# Patient Record
Sex: MALE | Race: WHITE | NOT HISPANIC OR LATINO | URBAN - METROPOLITAN AREA
[De-identification: names, ages, dates, MRNs, and addresses within clinical notes are randomized per-mention and may not be internally consistent; named-entity substitution may affect disease eponyms.]

---

## 2018-06-18 ENCOUNTER — INPATIENT (INPATIENT)
Facility: HOSPITAL | Age: 53
LOS: 1 days | Discharge: HOME | End: 2018-06-20
Attending: INTERNAL MEDICINE | Admitting: INTERNAL MEDICINE
Payer: COMMERCIAL

## 2018-06-18 VITALS
DIASTOLIC BLOOD PRESSURE: 73 MMHG | SYSTOLIC BLOOD PRESSURE: 141 MMHG | RESPIRATION RATE: 16 BRPM | HEART RATE: 130 BPM | TEMPERATURE: 99 F | OXYGEN SATURATION: 100 %

## 2018-06-18 DIAGNOSIS — D72.829 ELEVATED WHITE BLOOD CELL COUNT, UNSPECIFIED: ICD-10-CM

## 2018-06-18 DIAGNOSIS — I48.91 UNSPECIFIED ATRIAL FIBRILLATION: ICD-10-CM

## 2018-06-18 DIAGNOSIS — Z29.9 ENCOUNTER FOR PROPHYLACTIC MEASURES, UNSPECIFIED: ICD-10-CM

## 2018-06-18 DIAGNOSIS — Z09 ENCOUNTER FOR FOLLOW-UP EXAMINATION AFTER COMPLETED TREATMENT FOR CONDITIONS OTHER THAN MALIGNANT NEOPLASM: Chronic | ICD-10-CM

## 2018-06-18 LAB
ALBUMIN SERPL ELPH-MCNC: 4.4 G/DL — SIGNIFICANT CHANGE UP (ref 3.3–5)
ALP SERPL-CCNC: 73 U/L — SIGNIFICANT CHANGE UP (ref 40–120)
ALT FLD-CCNC: 11 U/L — SIGNIFICANT CHANGE UP (ref 4–41)
APTT BLD: 32.1 SEC — SIGNIFICANT CHANGE UP (ref 27.5–37.4)
AST SERPL-CCNC: 19 U/L — SIGNIFICANT CHANGE UP (ref 4–40)
BASOPHILS # BLD AUTO: 0.03 K/UL — SIGNIFICANT CHANGE UP (ref 0–0.2)
BASOPHILS NFR BLD AUTO: 0.2 % — SIGNIFICANT CHANGE UP (ref 0–2)
BILIRUB SERPL-MCNC: 1.1 MG/DL — SIGNIFICANT CHANGE UP (ref 0.2–1.2)
BUN SERPL-MCNC: 16 MG/DL — SIGNIFICANT CHANGE UP (ref 7–23)
CALCIUM SERPL-MCNC: 9 MG/DL — SIGNIFICANT CHANGE UP (ref 8.4–10.5)
CHLORIDE SERPL-SCNC: 102 MMOL/L — SIGNIFICANT CHANGE UP (ref 98–107)
CO2 SERPL-SCNC: 23 MMOL/L — SIGNIFICANT CHANGE UP (ref 22–31)
CREAT SERPL-MCNC: 1.08 MG/DL — SIGNIFICANT CHANGE UP (ref 0.5–1.3)
EOSINOPHIL # BLD AUTO: 0.08 K/UL — SIGNIFICANT CHANGE UP (ref 0–0.5)
EOSINOPHIL NFR BLD AUTO: 0.7 % — SIGNIFICANT CHANGE UP (ref 0–6)
GLUCOSE SERPL-MCNC: 176 MG/DL — HIGH (ref 70–99)
HCT VFR BLD CALC: 37.8 % — LOW (ref 39–50)
HGB BLD-MCNC: 12.7 G/DL — LOW (ref 13–17)
IMM GRANULOCYTES # BLD AUTO: 0.08 # — SIGNIFICANT CHANGE UP
IMM GRANULOCYTES NFR BLD AUTO: 0.7 % — SIGNIFICANT CHANGE UP (ref 0–1.5)
INR BLD: 1.24 — HIGH (ref 0.88–1.17)
LYMPHOCYTES # BLD AUTO: 2.6 K/UL — SIGNIFICANT CHANGE UP (ref 1–3.3)
LYMPHOCYTES # BLD AUTO: 21.6 % — SIGNIFICANT CHANGE UP (ref 13–44)
MCHC RBC-ENTMCNC: 30.1 PG — SIGNIFICANT CHANGE UP (ref 27–34)
MCHC RBC-ENTMCNC: 33.6 % — SIGNIFICANT CHANGE UP (ref 32–36)
MCV RBC AUTO: 89.6 FL — SIGNIFICANT CHANGE UP (ref 80–100)
MONOCYTES # BLD AUTO: 0.72 K/UL — SIGNIFICANT CHANGE UP (ref 0–0.9)
MONOCYTES NFR BLD AUTO: 6 % — SIGNIFICANT CHANGE UP (ref 2–14)
NEUTROPHILS # BLD AUTO: 8.5 K/UL — HIGH (ref 1.8–7.4)
NEUTROPHILS NFR BLD AUTO: 70.8 % — SIGNIFICANT CHANGE UP (ref 43–77)
NRBC # FLD: 0 — SIGNIFICANT CHANGE UP
PLATELET # BLD AUTO: 212 K/UL — SIGNIFICANT CHANGE UP (ref 150–400)
PMV BLD: 11.3 FL — SIGNIFICANT CHANGE UP (ref 7–13)
POTASSIUM SERPL-MCNC: 3.9 MMOL/L — SIGNIFICANT CHANGE UP (ref 3.5–5.3)
POTASSIUM SERPL-SCNC: 3.9 MMOL/L — SIGNIFICANT CHANGE UP (ref 3.5–5.3)
PROT SERPL-MCNC: 7.2 G/DL — SIGNIFICANT CHANGE UP (ref 6–8.3)
PROTHROM AB SERPL-ACNC: 13.8 SEC — HIGH (ref 9.8–13.1)
RBC # BLD: 4.22 M/UL — SIGNIFICANT CHANGE UP (ref 4.2–5.8)
RBC # FLD: 15.9 % — HIGH (ref 10.3–14.5)
SODIUM SERPL-SCNC: 140 MMOL/L — SIGNIFICANT CHANGE UP (ref 135–145)
TROPONIN T, HIGH SENSITIVITY RESULT: 48 NG/L — SIGNIFICANT CHANGE UP (ref ?–14)
TROPONIN T, HIGH SENSITIVITY RESULT: 48 NG/L — SIGNIFICANT CHANGE UP (ref ?–14)
TSH SERPL-MCNC: 3.18 UIU/ML — SIGNIFICANT CHANGE UP (ref 0.27–4.2)
WBC # BLD: 12.01 K/UL — HIGH (ref 3.8–10.5)
WBC # FLD AUTO: 12.01 K/UL — HIGH (ref 3.8–10.5)

## 2018-06-18 PROCEDURE — 71250 CT THORAX DX C-: CPT | Mod: 26

## 2018-06-18 PROCEDURE — 71045 X-RAY EXAM CHEST 1 VIEW: CPT | Mod: 26

## 2018-06-18 RX ORDER — SODIUM CHLORIDE 9 MG/ML
1000 INJECTION INTRAMUSCULAR; INTRAVENOUS; SUBCUTANEOUS ONCE
Qty: 0 | Refills: 0 | Status: COMPLETED | OUTPATIENT
Start: 2018-06-18 | End: 2018-06-18

## 2018-06-18 RX ORDER — APIXABAN 2.5 MG/1
5 TABLET, FILM COATED ORAL EVERY 12 HOURS
Qty: 0 | Refills: 0 | Status: DISCONTINUED | OUTPATIENT
Start: 2018-06-19 | End: 2018-06-20

## 2018-06-18 RX ORDER — DILTIAZEM HCL 120 MG
5 CAPSULE, EXT RELEASE 24 HR ORAL
Qty: 125 | Refills: 0 | Status: DISCONTINUED | OUTPATIENT
Start: 2018-06-18 | End: 2018-06-18

## 2018-06-18 RX ORDER — MAGNESIUM SULFATE 500 MG/ML
2 VIAL (ML) INJECTION ONCE
Qty: 0 | Refills: 0 | Status: COMPLETED | OUTPATIENT
Start: 2018-06-18 | End: 2018-06-18

## 2018-06-18 RX ORDER — ENOXAPARIN SODIUM 100 MG/ML
100 INJECTION SUBCUTANEOUS ONCE
Qty: 0 | Refills: 0 | Status: COMPLETED | OUTPATIENT
Start: 2018-06-18 | End: 2018-06-18

## 2018-06-18 RX ORDER — DILTIAZEM HCL 120 MG
10 CAPSULE, EXT RELEASE 24 HR ORAL
Qty: 125 | Refills: 0 | Status: DISCONTINUED | OUTPATIENT
Start: 2018-06-18 | End: 2018-06-19

## 2018-06-18 RX ADMIN — ENOXAPARIN SODIUM 100 MILLIGRAM(S): 100 INJECTION SUBCUTANEOUS at 16:28

## 2018-06-18 RX ADMIN — Medication 5 MG/HR: at 23:37

## 2018-06-18 RX ADMIN — SODIUM CHLORIDE 1000 MILLILITER(S): 9 INJECTION INTRAMUSCULAR; INTRAVENOUS; SUBCUTANEOUS at 16:15

## 2018-06-18 RX ADMIN — Medication 50 GRAM(S): at 16:22

## 2018-06-18 RX ADMIN — SODIUM CHLORIDE 1000 MILLILITER(S): 9 INJECTION INTRAMUSCULAR; INTRAVENOUS; SUBCUTANEOUS at 15:46

## 2018-06-18 RX ADMIN — Medication 5 MG/HR: at 21:15

## 2018-06-18 RX ADMIN — Medication 10 MG/HR: at 23:37

## 2018-06-18 NOTE — H&P ADULT - ASSESSMENT
54 y/o male with a PMHx of PAF years ago with self conversion to NSR never on A/C presents to ED with dizziness and lightheadedness on exertion, found to be in rapid atrial fibrillation.

## 2018-06-18 NOTE — H&P ADULT - ATTENDING COMMENTS
seen and examined  above HPI, PMH, SH, FH, Meds, Allergies and ROS noted and personally confirmed as accurate by me  52 y/o male with a PMH of PAF, one episode 7 years ago and pre-hypertension presents to ED with dizziness and light-headedness. Pt reports that he smoked a cigar on Saturday night and while smoking it, he began to experience a sensation of light-headedness. Pt reports that his symptoms have been intermittent since then, with his symptoms worsening when he is ambulating or exerting himself. Pt elicits that he feels it the most with "abrupt movement." see above HPI for more details    PMH as above  FH noncontributory    SH non smoker  no IVDA  no ETOH abuse   lives with spouse   self employed    Gen: no loss of wt no loss of appetite  ENT: no dizziness no hearing loss  Ophth: no blurring of vision no loss of vision  Resp: No cough no sputum production  CVS: see above HPI   GI: no N/V/D  : no dysuria, hematuria  Endo: no polyuria no excessive sweating  Neuro: no weakness no paresthesias  Psych: No suicidal no depressive ideation  Heme: No petechiae no easy bruising  Msk: No joint pain no swelling  Skin: No rash no itching  All other ROS negative    Plan: now in NSR  d/w cardiology attending   echocardiogram noted  likely acute on chronic diastolic heart failure precipitated by atrial fib with rapid ventricular rate  will diurese furosemide 20 IVP daily   likely discharge home tomorrow on Apixaban 5 BID   stress as outpatient    Leukocytosis  likely reactive   no antibiotics  Ct negative pneumonia   pulmonary help appreciated     discussed with patient in detail, all questions answered   d/w cardiology attending

## 2018-06-18 NOTE — H&P ADULT - NEUROLOGICAL DETAILS
responds to verbal commands/sensation intact/cranial nerves intact/normal strength/alert and oriented x 3/responds to pain

## 2018-06-18 NOTE — H&P ADULT - RS GEN PE MLT RESP DETAILS PC
no chest wall tenderness/no rhonchi/respirations non-labored/no intercostal retractions/no rales/no wheezes/good air movement/breath sounds equal/airway patent/clear to auscultation bilaterally

## 2018-06-18 NOTE — H&P ADULT - NSHPLABSRESULTS_GEN_ALL_CORE
EKG: Atrial fibrillation at 153 bpm with RVR  CE x2: Trop 48-->48  WBC: 12.01  H/H: 12.7/37.8  Glucose: 176

## 2018-06-18 NOTE — ED PROVIDER NOTE - ATTENDING CONTRIBUTION TO CARE
53M no pmh presents for palpitations, intermittent lightheadedness for past 3 days. Patient denies headache, vision changes, focal weakness/numbness, neck pain, fever, cough, chest pain, shortness of breath, back pain, abd pain, nausea, vomiting, diarrhea, constipation, blood in stool, dysuria, rash, trauma, falls. Patient is well appearing, conversant, cooperative, smiling, head atraumatic, neck supple with full range of motion, oropharynx clear, lungs clear, no crackles or rales, speaking full sentences, heart tachycardia irregularly irregular, distal pulses equal in all 4 extremities, abdomen soft nontender nondistended with no masses, no leg edema, no calf tenderness, nonfocal neurologic exam.    Afib RVR. Rate control. IVF. TSH. CXR. EKG. Troponin. Anticoagulate. Cards consult. Consider admission.

## 2018-06-18 NOTE — H&P ADULT - NEGATIVE NEUROLOGICAL SYMPTOMS
no tremors/no paresthesias/no generalized seizures/no focal seizures/no difficulty walking/no syncope/no loss of sensation/no weakness/no headache/no confusion/no loss of consciousness/no hemiparesis/no transient paralysis

## 2018-06-18 NOTE — H&P ADULT - NEGATIVE GASTROINTESTINAL SYMPTOMS
no melena/no constipation/no abdominal pain/no nausea/no hematochezia/no change in bowel habits/no flatulence/no diarrhea/no vomiting

## 2018-06-18 NOTE — H&P ADULT - PROBLEM SELECTOR PLAN 1
Admit to telemetry, serial cardiac enzymes, serial EKGs  Will check TFTs  Start Cardizem gtt at 5mg/hr and titrate drip based on HR  Start Cardizem 30mg PO q6hrs   Start Eliquis 5mg BID (received one dose of Lovenox in ED; Eliquis to start in AM)  Echocardiogram ordered  Cards consult with Dr. Hebert called  Will consider EP consult for surgical options if pt does not self convert  F/U MD note

## 2018-06-18 NOTE — H&P ADULT - NSHPSOCIALHISTORY_GEN_ALL_CORE
Pt is  and lives with his wife and two children. He denies smoking and drinking; he has a cigar every now and then. He is a self employed . He is independent at baseline.

## 2018-06-18 NOTE — ED PROVIDER NOTE - MEDICAL DECISION MAKING DETAILS
53M pmhx htn, hld c/o palpitations since saturday. Pt arrives here w/ rate in 140s-160s w/  systolic (in setting of known htn). Pt is in Afib w/ RVR by EKG w/ assoc hemodynamic changes. Will check labs, tsh, trop, cxr. WIll administer fluids, cxr, cardizem for rate control w/ admission.

## 2018-06-18 NOTE — ED ADULT NURSE NOTE - OBJECTIVE STATEMENT
Break coverage RN:  received pt in room 18, c/o palpitations since Saturday after having a drink and a cigar.  Pt reports has had an episode of rapid heart rate in the past, "was given a medication, and it went away."  Pt AAOx3, respirations even and unlabored.  Rapid A-fib noted on cardiac monitor; Dr. Aldrich at bedside.  Pt denies chest pain.  Pt reports episode of dizziness yesterday.  No peripheral edema noted; pt denies N/V/D.  Pt appears comfortable.  Labs drawn and sent; IV access obtained.  Awaiting further MD orders.

## 2018-06-18 NOTE — H&P ADULT - NEGATIVE OPHTHALMOLOGIC SYMPTOMS
no pain R/no loss of vision L/no blurred vision R/no loss of vision R/no photophobia/no diplopia/no blurred vision L/no pain L

## 2018-06-18 NOTE — H&P ADULT - NEGATIVE CARDIOVASCULAR SYMPTOMS
no peripheral edema/no chest pain/no dyspnea on exertion/no paroxysmal nocturnal dyspnea/no palpitations/no orthopnea/no claudication

## 2018-06-18 NOTE — ED ADULT TRIAGE NOTE - CHIEF COMPLAINT QUOTE
Pt c/o of palpitations and lightheadedness was seen at his PMD and sent in for further work up pt denies chest pain.

## 2018-06-18 NOTE — H&P ADULT - PROBLEM SELECTOR PLAN 2
Pt with leukocytosis of 12.01 with a mild cough and opacity on CXR  CT chest ordered to rule out infectious process driving atrial fibrillation  Will treat with antibiotics if CT chest shows PNA

## 2018-06-18 NOTE — ED PROVIDER NOTE - OBJECTIVE STATEMENT
53M hx htn, hld, palpitations (1 episode ~7 years ago), here from PMD's office. Per pt, he's been having palpitations since Saturday night, started while he was having a drink and a cigar. Sx not assoc w/ any cp or sob. Pt came accompanied by note from PMD saying pt's ekg showed Afib w/ RVR assoc w/ hypotension. No recent illness or f/c.

## 2018-06-18 NOTE — H&P ADULT - HISTORY OF PRESENT ILLNESS
52 y/o male with a PMHx of PAF (one episode 7 years ago with medication-induced self conversion) and pre-hypertension presents to ED with dizziness and light-headedness. Pt reports that he smoked a cigar on Saturday night and while smoking it, he began to experience a sensation of light-headedness. Pt reports that his symptoms have been intermittent since then, with his symptoms worsening when he is ambulating or exerting himself. Pt elicits that he feels it the most with "abrupt movement." Although it was documented that patient was having palpitations, he does not feel any palpitations. Pt had an episode of atrial fibrillation 7 years ago and was given medications by his then cardiologist and he converted to NSR with no other intervention. Pt was never started on any systemic anticoagulation. Pt continued to have symptoms today and went to his PCP Dr. Hebert. Pt underwent EKG with his cardiologist and was found to be in rapid atrial fibrillation and was sent to the ED for further evaluation. The only other symptom that patient endorses is a cough that started a couple of hours ago, which is dry but he feels like he may have some mucus in his chest. Pt denies fever, chills, recent travel, sick contacts, headache, visual deficits, chest pain, shortness of breath, orthopnea, palpitations, abdominal pain, N/V/D/C, hematochezia, melena, dysuria, hematuria, LOC, syncope, peripheral edema. Upon arrival to ED, EKG: Atrial fibrillation at 153 bpm with RVR. CE x2: Trop 48-->48. WBC: 12.01. H/H: 12.7/37.8. Glucose: 176. Pt was given Cardizem and Lovenox and admitted to telemetry.

## 2018-06-19 LAB
BUN SERPL-MCNC: 9 MG/DL — SIGNIFICANT CHANGE UP (ref 7–23)
CALCIUM SERPL-MCNC: 8.5 MG/DL — SIGNIFICANT CHANGE UP (ref 8.4–10.5)
CHLORIDE SERPL-SCNC: 104 MMOL/L — SIGNIFICANT CHANGE UP (ref 98–107)
CHOLEST SERPL-MCNC: 172 MG/DL — SIGNIFICANT CHANGE UP (ref 120–199)
CO2 SERPL-SCNC: 20 MMOL/L — LOW (ref 22–31)
CREAT SERPL-MCNC: 0.86 MG/DL — SIGNIFICANT CHANGE UP (ref 0.5–1.3)
GLUCOSE SERPL-MCNC: 123 MG/DL — HIGH (ref 70–99)
HBA1C BLD-MCNC: 4.7 % — SIGNIFICANT CHANGE UP (ref 4–5.6)
HCT VFR BLD CALC: 37.1 % — LOW (ref 39–50)
HDLC SERPL-MCNC: 31 MG/DL — LOW (ref 35–55)
HGB BLD-MCNC: 12.6 G/DL — LOW (ref 13–17)
LIPID PNL WITH DIRECT LDL SERPL: 130 MG/DL — SIGNIFICANT CHANGE UP
MAGNESIUM SERPL-MCNC: 2.1 MG/DL — SIGNIFICANT CHANGE UP (ref 1.6–2.6)
MCHC RBC-ENTMCNC: 30.1 PG — SIGNIFICANT CHANGE UP (ref 27–34)
MCHC RBC-ENTMCNC: 34 % — SIGNIFICANT CHANGE UP (ref 32–36)
MCV RBC AUTO: 88.5 FL — SIGNIFICANT CHANGE UP (ref 80–100)
NRBC # FLD: 0 — SIGNIFICANT CHANGE UP
NT-PROBNP SERPL-SCNC: 1720 PG/ML — SIGNIFICANT CHANGE UP
PLATELET # BLD AUTO: 181 K/UL — SIGNIFICANT CHANGE UP (ref 150–400)
PMV BLD: 11.6 FL — SIGNIFICANT CHANGE UP (ref 7–13)
POTASSIUM SERPL-MCNC: 4 MMOL/L — SIGNIFICANT CHANGE UP (ref 3.5–5.3)
POTASSIUM SERPL-SCNC: 4 MMOL/L — SIGNIFICANT CHANGE UP (ref 3.5–5.3)
RBC # BLD: 4.19 M/UL — LOW (ref 4.2–5.8)
RBC # FLD: 15.9 % — HIGH (ref 10.3–14.5)
SODIUM SERPL-SCNC: 138 MMOL/L — SIGNIFICANT CHANGE UP (ref 135–145)
T3FREE SERPL-MCNC: 2.74 PG/ML — SIGNIFICANT CHANGE UP (ref 1.8–4.6)
T4 FREE SERPL-MCNC: 1.28 NG/DL — SIGNIFICANT CHANGE UP (ref 0.9–1.8)
TRIGL SERPL-MCNC: 148 MG/DL — SIGNIFICANT CHANGE UP (ref 10–149)
WBC # BLD: 12.53 K/UL — HIGH (ref 3.8–10.5)
WBC # FLD AUTO: 12.53 K/UL — HIGH (ref 3.8–10.5)

## 2018-06-19 PROCEDURE — 93306 TTE W/DOPPLER COMPLETE: CPT | Mod: 26

## 2018-06-19 RX ORDER — APIXABAN 2.5 MG/1
1 TABLET, FILM COATED ORAL
Qty: 60 | Refills: 0 | OUTPATIENT
Start: 2018-06-19 | End: 2018-07-18

## 2018-06-19 RX ORDER — FUROSEMIDE 40 MG
20 TABLET ORAL ONCE
Qty: 0 | Refills: 0 | Status: COMPLETED | OUTPATIENT
Start: 2018-06-19 | End: 2018-06-19

## 2018-06-19 RX ORDER — METOPROLOL TARTRATE 50 MG
50 TABLET ORAL DAILY
Qty: 0 | Refills: 0 | Status: DISCONTINUED | OUTPATIENT
Start: 2018-06-19 | End: 2018-06-20

## 2018-06-19 RX ORDER — DIGOXIN 250 MCG
0.5 TABLET ORAL ONCE
Qty: 0 | Refills: 0 | Status: COMPLETED | OUTPATIENT
Start: 2018-06-19 | End: 2018-06-19

## 2018-06-19 RX ORDER — DILTIAZEM HCL 120 MG
15 CAPSULE, EXT RELEASE 24 HR ORAL
Qty: 125 | Refills: 0 | Status: DISCONTINUED | OUTPATIENT
Start: 2018-06-19 | End: 2018-06-19

## 2018-06-19 RX ORDER — FUROSEMIDE 40 MG
20 TABLET ORAL DAILY
Qty: 0 | Refills: 0 | Status: COMPLETED | OUTPATIENT
Start: 2018-06-19 | End: 2018-06-20

## 2018-06-19 RX ADMIN — Medication 0.5 MILLIGRAM(S): at 02:24

## 2018-06-19 RX ADMIN — Medication 20 MILLIGRAM(S): at 00:22

## 2018-06-19 RX ADMIN — Medication 20 MILLIGRAM(S): at 14:02

## 2018-06-19 RX ADMIN — APIXABAN 5 MILLIGRAM(S): 2.5 TABLET, FILM COATED ORAL at 05:48

## 2018-06-19 RX ADMIN — APIXABAN 5 MILLIGRAM(S): 2.5 TABLET, FILM COATED ORAL at 17:13

## 2018-06-19 RX ADMIN — Medication 50 MILLIGRAM(S): at 09:03

## 2018-06-19 RX ADMIN — Medication 10 MG/HR: at 07:25

## 2018-06-19 NOTE — CONSULT NOTE ADULT - SUBJECTIVE AND OBJECTIVE BOX
Patient is a 53y old  Male who presents with a chief complaint of Dizziness, light-headedness (18 Jun 2018 21:28)      HPI:  52 y/o male with a PMHx of PAF (one episode 7 years ago with medication-induced self conversion) and pre-hypertension presents to ED with dizziness and light-headedness. Pt reports that he smoked a cigar on Saturday night and while smoking it, he began to experience a sensation of light-headedness. Pt reports that his symptoms have been intermittent since then, with his symptoms worsening when he is ambulating or exerting himself. Pt elicits that he feels it the most with "abrupt movement." Although it was documented that patient was having palpitations, he does not feel any palpitations. Pt had an episode of atrial fibrillation 7 years ago and was given medications by his then cardiologist and he converted to NSR with no other intervention. Pt was never started on any systemic anticoagulation. Pt continued to have symptoms today and went to his PCP Dr. Hebert. Pt underwent EKG with his cardiologist and was found to be in rapid atrial fibrillation and was sent to the ED for further evaluation. The only other symptom that patient endorses is a cough that started a couple of hours ago, which is dry but he feels like he may have some mucus in his chest. Pt denies fever, chills, recent travel, sick contacts, headache, visual deficits, chest pain, shortness of breath, orthopnea, palpitations, abdominal pain, N/V/D/C, hematochezia, melena, dysuria, hematuria, LOC, syncope, peripheral edema. Upon arrival to ED, EKG: Atrial fibrillation at 153 bpm with RVR. CE x2: Trop 48-->48. WBC: 12.01. H/H: 12.7/37.8. Glucose: 176. Pt was given Cardizem and Lovenox and admitted to telemetry. (18 Jun 2018 21:28)    to me pt days his cough has resolved: H ehas no underlying pulmonary disorders: Never had COPD or asthma as well as PE or DVT   ?FOLLOWING PRESENT  [x ] Hx of PE/DVT, [x ] Hx COPD, [x ] Hx of Asthma, [x ] Hx of Hospitalization, [x ]  Hx of BiPAP/CPAP use, [x] Hx of NOAH    Allergies    No Known Allergies    Intolerances        PAST MEDICAL & SURGICAL HISTORY:  Pre-hypertension  PAF (paroxysmal atrial fibrillation)  S/P umbilical hernia repair, follow-up exam      FAMILY HISTORY:  No pertinent family history in first degree relatives      Social History: x ] TOBACCO                  [ x ] ETOH                                 [ x ] IVDA/DRUGS    REVIEW OF SYSTEMS      General:	    Skin/Breast:  	  Ophthalmologic:  	  ENMT:	    Respiratory and Thorax: cough  	  Cardiovascular:	    Gastrointestinal:	    Genitourinary:	    Musculoskeletal:	    Neurological:	light headed, palpitations    Psychiatric:	    Hematology/Lymphatics:	    Endocrine:	    Allergic/Immunologic:	    MEDICATIONS  (STANDING):  apixaban 5 milliGRAM(s) Oral every 12 hours  furosemide   Injectable 20 milliGRAM(s) IV Push daily  metoprolol succinate ER 50 milliGRAM(s) Oral daily    MEDICATIONS  (PRN):       Vital Signs Last 24 Hrs  T(C): 37.3 (19 Jun 2018 09:31), Max: 37.3 (18 Jun 2018 20:35)  T(F): 99.2 (19 Jun 2018 09:31), Max: 99.2 (19 Jun 2018 09:31)  HR: 81 (19 Jun 2018 09:31) (81 - 166)  BP: 115/75 (19 Jun 2018 09:31) (93/74 - 141/73)  BP(mean): --  RR: 20 (19 Jun 2018 09:31) (14 - 21)  SpO2: 99% (19 Jun 2018 09:31) (92% - 100%)        I&O's Summary      Physical Exam:   GENERAL: NAD, well-groomed, well-developed  HEENT: ISAÍAS/   Atraumatic, Normocephalic  ENMT: No tonsillar erythema, exudates, or enlargement; Moist mucous membranes, Good dentition, No lesions  NECK: Supple, No JVD, Normal thyroid  CHEST/LUNG: Clear to auscultation bilaterally; No rales, rhonchi, wheezing, or rubs  CVS: Regular rate and rhythm; No murmurs, rubs, or gallops  GI: : Soft, Nontender, Nondistended; Bowel sounds present  NERVOUS SYSTEM:  Alert & Oriented X3  EXTREMITIES:  2+ Peripheral Pulses, No clubbing, cyanosis, or edema  LYMPH: No lymphadenopathy noted  SKIN: No rashes or lesions  ENDOCRINOLOGY: No Thyromegaly  PSYCH: Appropriate    Labs:                              12.6   12.53 )-----------( 181      ( 19 Jun 2018 05:40 )             37.1                         12.7   12.01 )-----------( 212      ( 18 Jun 2018 15:42 )             37.8     06-19    138  |  104  |  9   ----------------------------<  123<H>  4.0   |  20<L>  |  0.86  06-18    140  |  102  |  16  ----------------------------<  176<H>  3.9   |  23  |  1.08    Ca    8.5      19 Jun 2018 05:40  Ca    9.0      18 Jun 2018 15:25  Mg     2.1     06-19    TPro  7.2  /  Alb  4.4  /  TBili  1.1  /  DBili  x   /  AST  19  /  ALT  11  /  AlkPhos  73  06-18    CAPILLARY BLOOD GLUCOSE        LIVER FUNCTIONS - ( 18 Jun 2018 15:25 )  Alb: 4.4 g/dL / Pro: 7.2 g/dL / ALK PHOS: 73 u/L / ALT: 11 u/L / AST: 19 u/L / GGT: x           PT/INR - ( 18 Jun 2018 15:25 )   PT: 13.8 SEC;   INR: 1.24          PTT - ( 18 Jun 2018 15:25 )  PTT:32.1 SEC    D DImer  Serum Pro-Brain Natriuretic Peptide: 1720 pg/mL (06-19 @ 05:40)      Studies  Chest X-RAY  CT SCAN Chest   CT Abdomen  Venous Dopplers: LE:   Others    < from: CT Chest No Cont (06.18.18 @ 22:30) >  LUNGS AND LARGE AIRWAYS: Patent central airways.  3 mm right upper lobe   noncalcified pulmonary nodule (series 2, image 33). 2 mm right upper lung   noncalcified pulmonary nodule (series 2, image 40). Interlobular septal   thickening. Bibasilar subsegmental atelectasis.  PLEURA: Trace bilateral pleural effusions, within the fissures   bilaterally.  VESSELS: Within normal limits.  HEART: Heart size is normal. No pericardial effusion. Coronary artery   calcifications.  MEDIASTINUM AND JASMYNE: No lymphadenopathy.  CHEST WALL AND LOWER NECK: Within normal limits.  VISUALIZED UPPER ABDOMEN: Within normal limits.  BONES: Within normal limits.    IMPRESSION:     Interlobular septal thickening with small amounts of fluid in the   fissures bilaterally as well as peribronchial thickening consistent with   interstitial edema.    No focal consolidations.              SUDARSHAN ORTEGA M.D., RADIOLOGY RESIDENT  This document has been electronically signed.  MARIUSZ ARRIOLA M.D., ATTENDING RADIOLOGIST  This document has been electronically signed. Jun 19 2018 10:16AM        < end of copied text >

## 2018-06-19 NOTE — PATIENT PROFILE ADULT. - NS TRANSFER PATIENT BELONGINGS
Jewelry/Money (specify)/Clothing/Cell Phone/PDA (specify)/wallet with money, electronic toothbrush, blue tooth set/Electronic Device (specify)

## 2018-06-19 NOTE — CONSULT NOTE ADULT - PROBLEM SELECTOR RECOMMENDATION 2
had some cough which seems ot be ok: CT scan chest reviewed which is c/w chf: He has small bilateral pl effusion with interlobar septal thickening: He has no underlying pneumonia: No consolidation: I dont think he deserves any antibiotics at this time: Cont lasix

## 2018-06-19 NOTE — CONSULT NOTE ADULT - SUBJECTIVE AND OBJECTIVE BOX
HISTORY OF PRESENT ILLNESS:    52 yo Man with history of HTN, remote PAF, compl of 2-3 days of palpitations that began 3 nights ago after smoking a cigar and having alcoholic drink.  + orthostatic dizziness.  No chest pain or SOB.  Came to office yest and found to be in AF with RVR and relatively low BP.  Referred to ER for eval where treated with IV and PO diltiazem, IVF.  Started on Eliquis and given one dose of IV lasix.  Pt converted to SR overnight.  Currently no complaints and feels well.      PAST MEDICAL & SURGICAL HISTORY:  HTN  PAF (paroxysmal atrial fibrillation)  S/P umbilical hernia repair, follow-up exam          MEDICATIONS:  MEDICATIONS  (STANDING):  apixaban 5 milliGRAM(s) Oral every 12 hours  metoprolol succinate ER 50 milliGRAM(s) Oral daily    MEDICATIONS  (PRN):      FAM HISTORY  FAMILY HISTORY:  No pertinent family history in first degree relatives      SOCIAL HISTORY:    Social History:    Tobacco Usage:  (   ) never smoked   (   ) former smoker   (   ) current smoker  (     ) pack years  (        ) last cigarette date + occas cigar smoking  Alcohol Usage:  + social use  (     ) Advanced Directives: (     ) None    (      ) DNR    (     ) DNI    (     ) Health Care Proxy:     ALLERGIES:  Allergies    No Known Allergies    PHYSICAL EXAM:  T(C): 36.7 (06-19-18 @ 05:45), Max: 37.3 (06-18-18 @ 20:35)  HR: 82 (06-19-18 @ 05:45) (82 - 166)  BP: 125/75 (06-19-18 @ 05:45) (93/74 - 141/73)  RR: 14 (06-19-18 @ 05:45) (14 - 21)  SpO2: 95% (06-19-18 @ 05:45) (92% - 100%)  Wt(kg): --  I&O's Summary      Appearance:  Comf, NAD  HEENT:   Normal oral mucosa, PERRL, EOMI	  Lymphatic: No lymphadenopathy  Cardiovascular: Normal S1 S2, Murmur:   Neck: JVP normal  Respiratory: Lungs clear to auscultation	  Gastrointestinal:  Soft, Non-tender, + BS	  Skin: No rashes, No ecchymoses, No cyanosis	  Neurologic: Non-focal, awake , alert , oriented   Extremities: Normal range of motion, No clubbing, cyanosis or edema  Vascular: Peripheral pulses palpable 2+ bilaterally    TELEMETRY: 	  SR  ECG:  	AF with RVR, non-spec ST seg abn  RADIOLOGY:    < from: CT Chest No Cont (06.18.18 @ 22:30) >  EXAM:  CT CHEST        PROCEDURE DATE:  Jun 18 2018     ******PRELIMINARY REPORT******    ******PRELIMINARY REPORT******            INTERPRETATION:  nonspecific interlobular septal thickening and   grounglass opacities ? pulm edema and/or infection. clinical correlation   advised.            ******PRELIMINARY REPORT******    ******PRELIMINARY REPORT******              IVONNE BLISS M.D., ATTENDING RADIOLOGIST          < end of copied text >  	  	  LABS:	 	    CARDIAC MARKERS:                                  12.6   12.53 )-----------( 181      ( 19 Jun 2018 05:40 )             37.1     06-19    138  |  104  |  9   ----------------------------<  123<H>  4.0   |  20<L>  |  0.86    Ca    8.5      19 Jun 2018 05:40  Mg     2.1     06-19    TPro  7.2  /  Alb  4.4  /  TBili  1.1  /  DBili  x   /  AST  19  /  ALT  11  /  AlkPhos  73  06-18    Alanine Aminotransferase (ALT/SGPT): 11 u/L (06-18 @ 15:25)  HgA1c: Hemoglobin A1C, Whole Blood: 4.7 % (06-19 @ 05:40)    TSH: Thyroid Stimulating Hormone, Serum: 3.18 uIU/mL (06-18 @ 15:25)

## 2018-06-20 ENCOUNTER — TRANSCRIPTION ENCOUNTER (OUTPATIENT)
Age: 53
End: 2018-06-20

## 2018-06-20 VITALS — WEIGHT: 229.94 LBS

## 2018-06-20 LAB
BUN SERPL-MCNC: 13 MG/DL — SIGNIFICANT CHANGE UP (ref 7–23)
CALCIUM SERPL-MCNC: 8.8 MG/DL — SIGNIFICANT CHANGE UP (ref 8.4–10.5)
CHLORIDE SERPL-SCNC: 101 MMOL/L — SIGNIFICANT CHANGE UP (ref 98–107)
CO2 SERPL-SCNC: 22 MMOL/L — SIGNIFICANT CHANGE UP (ref 22–31)
CREAT SERPL-MCNC: 0.94 MG/DL — SIGNIFICANT CHANGE UP (ref 0.5–1.3)
GLUCOSE SERPL-MCNC: 117 MG/DL — HIGH (ref 70–99)
HCT VFR BLD CALC: 37.2 % — LOW (ref 39–50)
HGB BLD-MCNC: 13.2 G/DL — SIGNIFICANT CHANGE UP (ref 13–17)
MAGNESIUM SERPL-MCNC: 2.2 MG/DL — SIGNIFICANT CHANGE UP (ref 1.6–2.6)
MCHC RBC-ENTMCNC: 30 PG — SIGNIFICANT CHANGE UP (ref 27–34)
MCHC RBC-ENTMCNC: 35.5 % — SIGNIFICANT CHANGE UP (ref 32–36)
MCV RBC AUTO: 84.5 FL — SIGNIFICANT CHANGE UP (ref 80–100)
NRBC # FLD: 0 — SIGNIFICANT CHANGE UP
PLATELET # BLD AUTO: 183 K/UL — SIGNIFICANT CHANGE UP (ref 150–400)
PMV BLD: 11.6 FL — SIGNIFICANT CHANGE UP (ref 7–13)
POTASSIUM SERPL-MCNC: 3.8 MMOL/L — SIGNIFICANT CHANGE UP (ref 3.5–5.3)
POTASSIUM SERPL-SCNC: 3.8 MMOL/L — SIGNIFICANT CHANGE UP (ref 3.5–5.3)
RBC # BLD: 4.4 M/UL — SIGNIFICANT CHANGE UP (ref 4.2–5.8)
RBC # FLD: 15.8 % — HIGH (ref 10.3–14.5)
SODIUM SERPL-SCNC: 139 MMOL/L — SIGNIFICANT CHANGE UP (ref 135–145)
WBC # BLD: 6.81 K/UL — SIGNIFICANT CHANGE UP (ref 3.8–10.5)
WBC # FLD AUTO: 6.81 K/UL — SIGNIFICANT CHANGE UP (ref 3.8–10.5)

## 2018-06-20 RX ORDER — METOPROLOL TARTRATE 50 MG
1 TABLET ORAL
Qty: 30 | Refills: 0 | OUTPATIENT
Start: 2018-06-20 | End: 2018-07-19

## 2018-06-20 RX ADMIN — APIXABAN 5 MILLIGRAM(S): 2.5 TABLET, FILM COATED ORAL at 05:38

## 2018-06-20 RX ADMIN — Medication 50 MILLIGRAM(S): at 05:38

## 2018-06-20 RX ADMIN — Medication 20 MILLIGRAM(S): at 05:38

## 2018-06-20 NOTE — PROGRESS NOTE ADULT - PROBLEM SELECTOR PLAN 1
now in NSR  will continue to monitor as outpatient with Dr Hebert  continue metoprolol ER 50 and apixaban 5 po BID  outpatient follow up with cardiology attending for nuclear stress
cobntrolled : stable

## 2018-06-20 NOTE — DISCHARGE NOTE ADULT - PATIENT PORTAL LINK FT
You can access the [facility] Patient Portal, offered by [facility], by registering with the following website: [email or web address]

## 2018-06-20 NOTE — PROGRESS NOTE ADULT - SUBJECTIVE AND OBJECTIVE BOX
Patient is a 53y old  Male who presents with a chief complaint of Dizziness, light-headedness      SUBJECTIVE / OVERNIGHT EVENTS: no overnight events  feels great     ROS:  Resp: No cough no sputum production  CVS: No chest pain no palpitations no orthopnea  GI: no N/V/D  : no dysuria, no hematuria  Neuro: no weakness no paresthesias  Heme: No petechiae no easy bruising  Msk: No joint pain no swelling  Skin: No rash no itching        MEDICATIONS  (STANDING):  apixaban 5 milliGRAM(s) Oral every 12 hours  metoprolol succinate ER 50 milliGRAM(s) Oral daily    MEDICATIONS  (PRN):        CAPILLARY BLOOD GLUCOSE        I&O's Summary    19 Jun 2018 07:01  -  20 Jun 2018 07:00  --------------------------------------------------------  IN: 0 mL / OUT: 1300 mL / NET: -1300 mL        Vital Signs Last 24 Hrs  T(C): 36.8 (20 Jun 2018 05:37), Max: 36.9 (19 Jun 2018 20:32)  T(F): 98.2 (20 Jun 2018 05:37), Max: 98.5 (19 Jun 2018 20:32)  HR: 75 (20 Jun 2018 05:37) (74 - 77)  BP: 107/71 (20 Jun 2018 05:37) (107/71 - 136/86)  BP(mean): --  RR: 18 (20 Jun 2018 05:37) (17 - 18)  SpO2: 100% (20 Jun 2018 05:37) (96% - 100%)    PHYSICAL EXAM:  GENERAL: NAD, well-developed  HEAD:  Atraumatic, Normocephalic  EYES: EOMI, PERRLA, conjunctiva and sclera clear  NECK: Supple, No JVD  CHEST/LUNG: no rhonchi, no wheeze, clear to auscultation bilaterally  HEART: S1 S2; No rubs or gallops, no murmurs appreciated  ABDOMEN: Soft, Nontender, Nondistended; Bowel sounds present  EXTREMITIES:  No clubbing or cyanosis, + Peripheral Pulses,  no edema  PSYCH: AO x 3 appropriate affect  NEUROLOGY: non-focal, motor and sensory systems intact  SKIN: No rashes or lesions    LABS:                        13.2   6.81  )-----------( 183      ( 20 Jun 2018 05:42 )             37.2     06-20    139  |  101  |  13  ----------------------------<  117<H>  3.8   |  22  |  0.94    Ca    8.8      20 Jun 2018 05:43  Mg     2.2     06-20    TPro  7.2  /  Alb  4.4  /  TBili  1.1  /  DBili  x   /  AST  19  /  ALT  11  /  AlkPhos  73  06-18    PT/INR - ( 18 Jun 2018 15:25 )   PT: 13.8 SEC;   INR: 1.24          PTT - ( 18 Jun 2018 15:25 )  PTT:32.1 SEC            All consultant(s) notes reviewed and care discussed with other providers    Contact Number, Dr Brower [phone]
Events noted.  Case discussed with Dr Brower yesterday.  Pt given additional diuretic yesterday with good diuretic response.  Feels well, no complaints.  No recurrence of AF on tele.        MEDICATIONS:  MEDICATIONS  (STANDING):  apixaban 5 milliGRAM(s) Oral every 12 hours  metoprolol succinate ER 50 milliGRAM(s) Oral daily    MEDICATIONS  (PRN):      PHYSICAL EXAM:  T(C): 36.8 (06-20-18 @ 05:37), Max: 36.9 (06-19-18 @ 20:32)  HR: 75 (06-20-18 @ 05:37) (74 - 77)  BP: 107/71 (06-20-18 @ 05:37) (107/71 - 136/86)  RR: 18 (06-20-18 @ 05:37) (17 - 18)  SpO2: 100% (06-20-18 @ 05:37) (96% - 100%)  Wt(kg): --  I&O's Summary    19 Jun 2018 07:01  -  20 Jun 2018 07:00  --------------------------------------------------------  IN: 0 mL / OUT: 1300 mL / NET: -1300 mL      LABS:                              13.2   6.81  )-----------( 183      ( 20 Jun 2018 05:42 )             37.2     06-20    139  |  101  |  13  ----------------------------<  117<H>  3.8   |  22  |  0.94    Ca    8.8      20 Jun 2018 05:43  Mg     2.2     06-20    TPro  7.2  /  Alb  4.4  /  TBili  1.1  /  DBili  x   /  AST  19  /  ALT  11  /  AlkPhos  73  06-18    Magnesium, Serum: 2.2 mg/dL (06-20 @ 05:43)    proBNP:  > 1000  Lipid Profile:   TSH:   WNL          TELEMETRY: 	  SR  ECG:  	  RADIOLOGY:   DIAGNOSTIC TESTING:  Echocardiogram:  < from: Transthoracic Echocardiogram (06.19.18 @ 10:10) >  CONCLUSIONS:  1. Normal mitral valve. Minimal mitral regurgitation.  2. Normal trileaflet aortic valve. Minimal aortic  regurgitation.  3. Increased relative wall thickness with normal left  ventricular mass index, consistent with concentric left  ventricular remodeling.  4. Normal left ventricular systolic function. No segmental  wall motion abnormalities.  5. Normal right ventricular size and function.  ------------------------------------------------------------------------  Confirmed on  6/19/2018 - 11:26:42 by Marcia Huizar M.D. RPVI    < end of copied text >
Patient is a 53y old  Male who presents with a chief complaint of Dizziness, light-headedness (20 Jun 2018 11:26)      Any change in ROS: doin gok : no OSB : n ocugh     MEDICATIONS  (STANDING):  apixaban 5 milliGRAM(s) Oral every 12 hours  metoprolol succinate ER 50 milliGRAM(s) Oral daily    MEDICATIONS  (PRN):    Vital Signs Last 24 Hrs  T(C): 36.8 (20 Jun 2018 05:37), Max: 36.9 (19 Jun 2018 20:32)  T(F): 98.2 (20 Jun 2018 05:37), Max: 98.5 (19 Jun 2018 20:32)  HR: 75 (20 Jun 2018 05:37) (74 - 77)  BP: 107/71 (20 Jun 2018 05:37) (107/71 - 136/86)  BP(mean): --  RR: 18 (20 Jun 2018 05:37) (17 - 18)  SpO2: 100% (20 Jun 2018 05:37) (96% - 100%)    I&O's Summary    19 Jun 2018 07:01  -  20 Jun 2018 07:00  --------------------------------------------------------  IN: 0 mL / OUT: 1300 mL / NET: -1300 mL          Physical Exam:   GENERAL: NAD, well-groomed, well-developed  HEENT: ISAÍAS/   Atraumatic, Normocephalic  ENMT: No tonsillar erythema, exudates, or enlargement; Moist mucous membranes, Good dentition, No lesions  NECK: Supple, No JVD, Normal thyroid  CHEST/LUNG: Clear to auscultaion, ; No rales, rhonchi, wheezing, or rubs  CVS: Regular rate and rhythm; No murmurs, rubs, or gallops  GI: : Soft, Nontender, Nondistended; Bowel sounds present  NERVOUS SYSTEM:  Alert & Oriented X3, Good concentration; Motor Strength 5/5 B/L upper and lower extremities; DTRs 2+ intact and symmetric  EXTREMITIES:  2+ Peripheral Pulses, No clubbing, cyanosis, or edema  LYMPH: No lymphadenopathy noted  SKIN: No rashes or lesions  ENDOCRINOLOGY: No Thyromegaly  PSYCH: Appropriate    Labs:                              13.2   6.81  )-----------( 183      ( 20 Jun 2018 05:42 )             37.2                         12.6   12.53 )-----------( 181      ( 19 Jun 2018 05:40 )             37.1                         12.7   12.01 )-----------( 212      ( 18 Jun 2018 15:42 )             37.8     06-20    139  |  101  |  13  ----------------------------<  117<H>  3.8   |  22  |  0.94  06-19    138  |  104  |  9   ----------------------------<  123<H>  4.0   |  20<L>  |  0.86  06-18    140  |  102  |  16  ----------------------------<  176<H>  3.9   |  23  |  1.08    Ca    8.8      20 Jun 2018 05:43  Ca    8.5      19 Jun 2018 05:40  Ca    9.0      18 Jun 2018 15:25  Mg     2.2     06-20  Mg     2.1     06-19    TPro  7.2  /  Alb  4.4  /  TBili  1.1  /  DBili  x   /  AST  19  /  ALT  11  /  AlkPhos  73  06-18    CAPILLARY BLOOD GLUCOSE          LIVER FUNCTIONS - ( 18 Jun 2018 15:25 )  Alb: 4.4 g/dL / Pro: 7.2 g/dL / ALK PHOS: 73 u/L / ALT: 11 u/L / AST: 19 u/L / GGT: x           PT/INR - ( 18 Jun 2018 15:25 )   PT: 13.8 SEC;   INR: 1.24          PTT - ( 18 Jun 2018 15:25 )  PTT:32.1 SEC    Serum Pro-Brain Natriuretic Peptide: 1720 pg/mL (06-19 @ 05:40)        RECENT CULTURES:        RESPIRATORY CULTURES:          Studies  Chest X-RAY  CT SCAN Chest   Venous Dopplers: LE:   CT Abdomen  Others    < from: CT Chest No Cont (06.18.18 @ 22:30) >  EXAM:  CT CHEST        PROCEDURE DATE:  Jun 18 2018         INTERPRETATION:  CLINICAL INFORMATION: Cough. Opacity on chest x-ray    COMPARISON: Chest radiograph 6/18/2018    PROCEDURE:   CT of the Chest was performed without intravenous contrast.  Sagittal and coronal reformats were performed.      FINDINGS:    CHEST:     LUNGS AND LARGE AIRWAYS: Patent central airways.  3 mm right upper lobe   noncalcified pulmonary nodule (series 2, image 33). 2 mm right upper lung   noncalcified pulmonary nodule (series 2, image 40). Interlobular septal   thickening. Bibasilar subsegmental atelectasis.  PLEURA: Trace bilateral pleural effusions, within the fissures   bilaterally.  VESSELS: Within normal limits.  HEART: Heart size is normal. No pericardial effusion. Coronary artery   calcifications.  MEDIASTINUM AND JASMYNE: No lymphadenopathy.  CHEST WALL AND LOWER NECK: Within normal limits.  VISUALIZED UPPER ABDOMEN: Within normal limits.  BONES: Within normal limits.    IMPRESSION:     Interlobular septal thickening with small amounts of fluid in the   fissures bilaterally as well as peribronchial thickening consistent with   interstitial edema.    No focal consolidations.              SUDARSHAN ORTEGA M.D., RADIOLOGY RESIDENT  This document has been electronically signed.  MARIUSZ ARRIOLA M.D., ATTENDING RADIOLOGIST  This document has been electronically signed. Jun 19 2018 10:16AM              < end of copied text >

## 2018-06-20 NOTE — DISCHARGE NOTE ADULT - MEDICATION SUMMARY - MEDICATIONS TO TAKE
I will START or STAY ON the medications listed below when I get home from the hospital:    apixaban 5 mg oral tablet  -- 1 tab(s) by mouth every 12 hours    ***Test SCRIPT **** Please call [phone] to review if covered  -- Indication: For A Fib    metoprolol succinate 50 mg oral tablet, extended release  -- 1 tab(s) by mouth once a day  -- Indication: For Leukocytosis

## 2018-06-20 NOTE — DISCHARGE NOTE ADULT - CARE PROVIDER_API CALL
Giuseppe Hebert (MD), Cardiovascular Disease; Internal Medicine  [address] [place] [address]  Phone: [phone]  Fax: [phone]

## 2018-06-20 NOTE — DISCHARGE NOTE ADULT - PLAN OF CARE
To restore or maintain a normal heart rate and rhythm, to prevent blood clots, and decrease the risks of stroke CVA/TIA. Please take your medications as prescribed.  Continue to take your blood thinner as prescribed and follow with your physician to monitor your levels.  Low fat diet, reduce caffeine intake, and exercise at least 30 minutes daily. To relieve and prevent worsening symptoms associated with congestive heart failure, to improve quality of life, and to treat underlying conditions such as coronary heart disease, high blood pressure, or diabetes, and to maintain euvolemia. Low salt diet, fluid restriction to 1500 ml daily, monitor your fluid intake and weight daily, exercise as tolerated 30 minutes daily, and follow up with your physician within 1 to 2 weeks.  Follow up with Your Cardiologist in 1-2 weeks

## 2018-06-20 NOTE — DISCHARGE NOTE ADULT - CARE PLAN
Principal Discharge DX:	Rapid atrial fibrillation  Goal:	To restore or maintain a normal heart rate and rhythm, to prevent blood clots, and decrease the risks of stroke CVA/TIA.  Assessment and plan of treatment:	Please take your medications as prescribed.  Continue to take your blood thinner as prescribed and follow with your physician to monitor your levels.  Low fat diet, reduce caffeine intake, and exercise at least 30 minutes daily.  Secondary Diagnosis:	CHF (congestive heart failure)  Goal:	To relieve and prevent worsening symptoms associated with congestive heart failure, to improve quality of life, and to treat underlying conditions such as coronary heart disease, high blood pressure, or diabetes, and to maintain euvolemia.  Assessment and plan of treatment:	Low salt diet, fluid restriction to 1500 ml daily, monitor your fluid intake and weight daily, exercise as tolerated 30 minutes daily, and follow up with your physician within 1 to 2 weeks.  Follow up with Your Cardiologist in 1-2 weeks

## 2019-10-23 NOTE — PROGRESS NOTE ADULT - ASSESSMENT
52 y/o male with a PMHx of PAF years ago with self conversion to NSR never on A/C presents to ED with dizziness and lightheadedness on exertion, found to be in rapid atrial fibrillation.
54 y/o male with a PMHx of PAF years ago with self conversion to NSR never on A/C presents to ED with dizziness and lightheadedness on exertion, found to be in rapid atrial fibrillation.
Parox AF with tachycardia - resolved  Acute DHF secondary to PAF/tachycardia - improved  HTN - stable  Leukocytosis - resolved    Rec:  Discharge plan per Medicine  Stay on current dose of Eliquis and Metoprolol  Outpatient follow up in office next week
gwendolyn hernández

## 2022-02-04 NOTE — ED PROVIDER NOTE - NS ED NOTE AC HIGH RISK COUNTRIES
Lower Extremity Discharge Instructions      Apply ice for 48 - 72 hours. Elevate above the heart for 48 - 72 hours. Remove dressing after 48 hours and then okay to shower     Weight bearing as tolerated    Follow up with Dr. Lucila May in 7-10 days    Soap and water washes, may use Hibiclens          DISCHARGE SUMMARY from Nurse    PATIENT INSTRUCTIONS:    After general anesthesia or intravenous sedation, for 24 hours or while taking prescription Narcotics:  · Limit your activities  · Do not drive and operate hazardous machinery  · Do not make important personal or business decisions  · Do  not drink alcoholic beverages  · If you have not urinated within 8 hours after discharge, please contact your surgeon on call.     Report the following to your surgeon:  · Excessive pain, swelling, redness or odor of or around the surgical area  · Temperature over 100.5  · Nausea and vomiting lasting longer than 4 hours or if unable to take medications  · Any signs of decreased circulation or nerve impairment to extremity: change in color, persistent  numbness, tingling, coldness or increase pain  · Any questions
No

## 2022-08-18 NOTE — PROGRESS NOTE ADULT - NSHPATTENDINGPLANDISCUSS_GEN_ALL_CORE
Tele PA
pt
Mastoid Interpolation Flap Text: A decision was made to reconstruct the defect utilizing an interpolation axial flap and a staged reconstruction.  A telfa template was made of the defect.  This telfa template was then used to outline the mastoid interpolation flap.  The donor area for the pedicle flap was then injected with anesthesia.  The flap was excised through the skin and subcutaneous tissue down to the layer of the underlying musculature.  The pedicle flap was carefully excised within this deep plane to maintain its blood supply.  The edges of the donor site were undermined.   The donor site was closed in a primary fashion.  The pedicle was then rotated into position and sutured.  Once the tube was sutured into place, adequate blood supply was confirmed with blanching and refill.  The pedicle was then wrapped with xeroform gauze and dressed appropriately with a telfa and gauze bandage to ensure continued blood supply and protect the attached pedicle.

## 2022-09-20 PROBLEM — R03.0 ELEVATED BLOOD-PRESSURE READING, WITHOUT DIAGNOSIS OF HYPERTENSION: Chronic | Status: ACTIVE | Noted: 2018-06-18

## 2022-09-20 PROBLEM — I48.0 PAROXYSMAL ATRIAL FIBRILLATION: Chronic | Status: ACTIVE | Noted: 2018-06-18

## 2022-10-06 ENCOUNTER — APPOINTMENT (OUTPATIENT)
Dept: PULMONOLOGY | Facility: CLINIC | Age: 57
End: 2022-10-06
Payer: COMMERCIAL

## 2022-10-06 ENCOUNTER — NON-APPOINTMENT (OUTPATIENT)
Age: 57
End: 2022-10-06

## 2022-10-06 ENCOUNTER — APPOINTMENT (OUTPATIENT)
Dept: PULMONOLOGY | Facility: CLINIC | Age: 57
End: 2022-10-06

## 2022-10-06 VITALS
BODY MASS INDEX: 32.93 KG/M2 | DIASTOLIC BLOOD PRESSURE: 77 MMHG | TEMPERATURE: 97.9 F | HEIGHT: 70 IN | HEART RATE: 89 BPM | WEIGHT: 230 LBS | SYSTOLIC BLOOD PRESSURE: 115 MMHG | OXYGEN SATURATION: 96 %

## 2022-10-06 DIAGNOSIS — I10 ESSENTIAL (PRIMARY) HYPERTENSION: ICD-10-CM

## 2022-10-06 LAB — POCT - HEMOGLOBIN (HGB), QUANTITATIVE, TRANSCUTANEOUS: 15.2

## 2022-10-06 PROCEDURE — 94729 DIFFUSING CAPACITY: CPT

## 2022-10-06 PROCEDURE — 95012 NITRIC OXIDE EXP GAS DETER: CPT

## 2022-10-06 PROCEDURE — 88738 HGB QUANT TRANSCUTANEOUS: CPT

## 2022-10-06 PROCEDURE — 94010 BREATHING CAPACITY TEST: CPT

## 2022-10-06 PROCEDURE — 99204 OFFICE O/P NEW MOD 45 MIN: CPT | Mod: 25

## 2022-10-06 PROCEDURE — ZZZZZ: CPT

## 2022-10-06 PROCEDURE — 94727 GAS DIL/WSHOT DETER LNG VOL: CPT

## 2022-10-07 PROBLEM — I10 HYPERTENSION: Status: ACTIVE | Noted: 2022-10-07

## 2022-10-07 NOTE — PROCEDURE
[FreeTextEntry1] : Pulmonary Function Test performed in my office today: Spirometry: Within normal limits; Lung Volume: Within normal limits; Diffusion: Within normal limits.\par \par  Exhaled Nitric Oxide             Final\par \par No Documents Attached\par \par \par   Test   Result   Flag Reference Goal \par   Exhaled Nitric Oxide 28      \par \par  Ordered by: NGHIA ROBLES       Collected/Examined: 06Oct2022 01:59PM       \par Verified by: NGHIA ROBLES 06Oct2022 04:59PM       \par  Result Communication: No patient communication needed at this time;\par Stage: Final       \par  Performed at: In Office       Performed by: NGHIA ROBLES       Resulted: 06Oct2022 01:59PM       Last Updated: 06Oct2022 04:59PM       Accession: [id number]       \par

## 2022-10-07 NOTE — HISTORY OF PRESENT ILLNESS
[TextBox_4] : Jd is a pleasant 57-year-old gentleman with history of hypertension, ex-cigarette smoker, he is concerned with possibility of lung cancer due to history of cigarette smoking, he has mild cough at times, but no chest pain or shortness of breath.\par He is a former cigarette smoker, quit 10 years ago, used to smoke 1 pack/day for 30 years.

## 2022-10-07 NOTE — CONSULT LETTER
[Dear  ___] : Dear  [unfilled], [Courtesy Letter:] : I had the pleasure of seeing your patient, [unfilled], in my office today. [Please see my note below.] : Please see my note below. [Consult Closing:] : Thank you very much for allowing me to participate in the care of this patient.  If you have any questions, please do not hesitate to contact me. [Sincerely,] : Sincerely, [FreeTextEntry3] : Dr. Sue Judge

## 2022-10-07 NOTE — DISCUSSION/SUMMARY
[FreeTextEntry1] : Jd is a patient with history of cigarette smoking, rule out lung nodules/lung malignancy.  Secondly, he is a patient with history of hypertension.

## 2022-10-07 NOTE — ASSESSMENT
[FreeTextEntry1] : Get low-dose lung cancer screening chest CT scan for further evaluation.\par Advised him to return for pulmonary follow-up after chest CT scan to be performed.\par Also advised him on continued cigarette smoking cessation.\par Advised him on low-salt diet.\par

## 2022-11-07 ENCOUNTER — NON-APPOINTMENT (OUTPATIENT)
Age: 57
End: 2022-11-07

## 2022-11-07 VITALS — HEIGHT: 70 IN | WEIGHT: 215 LBS | BODY MASS INDEX: 30.78 KG/M2

## 2022-11-07 NOTE — HISTORY OF PRESENT ILLNESS
[Former] : Former [TextBox_13] : Referred by Dr. Judge\par \par Mr. RENETTA RODRIGUEZ is a 57 year old man with a history of nicotine dependence\par \par  Over the telephone today we reviewed and confirmed that the patient meets screening eligibility criteria:\par \par -Age: 57 years old\par \par Smoking status:\par \par -Former smoker\par \par \par -Number of pack(s) per day: 1\par \par -Number of years smoked: 30\par \par -Number of pack years smokin\par \par -Number of years since quitting smoking: 10\par \par -Quit year:\par \par \par Mr. RODRIGUEZ denies any personal history of lung cancer. Denies any s/s of lung cancer. No lung cancer in a 1st degree relative. Denies any history of lung disease. Denies any history of occupational exposures\par  [YearQuit] : 2012 [PacksperDay] : 1 [N_Years] : 30 [PacksperYear] : 30

## 2022-11-07 NOTE — REASON FOR VISIT
[Home] : at home, [unfilled] , at the time of the visit. [Medical Office: ([facility])___] : at the medical office located in  [Verbal consent obtained from patient] : the patient, [unfilled] [Initial Evaluation] : an initial evaluation visit [Review of Eligibility] : review of eligibility [Low-Dose CT Screening Discussion] : low-dose CT lung cancer screening discussion [Virtual Visit] : virtual visit

## 2022-11-07 NOTE — PLAN
[FreeTextEntry1] : Plan:\par \par -Low Dose CT chest for lung cancer screening scheduled for 11/8 at [facility]\par \par -Patient to follow up with Dr. Judge\par \par -Encouraged continued smoking abstinence\par \par \par \par Engaged in shared decision making with  RENETTA RODRIGUEZ . Answered all questions. He verbalized understanding and agreement. He knows to call back with any questions or concerns\par

## 2022-11-08 ENCOUNTER — OUTPATIENT (OUTPATIENT)
Dept: OUTPATIENT SERVICES | Facility: HOSPITAL | Age: 57
LOS: 1 days | End: 2022-11-08
Payer: COMMERCIAL

## 2022-11-08 ENCOUNTER — APPOINTMENT (OUTPATIENT)
Dept: CT IMAGING | Facility: IMAGING CENTER | Age: 57
End: 2022-11-08

## 2022-11-08 DIAGNOSIS — Z09 ENCOUNTER FOR FOLLOW-UP EXAMINATION AFTER COMPLETED TREATMENT FOR CONDITIONS OTHER THAN MALIGNANT NEOPLASM: Chronic | ICD-10-CM

## 2022-11-08 DIAGNOSIS — Z87.891 PERSONAL HISTORY OF NICOTINE DEPENDENCE: ICD-10-CM

## 2022-11-08 PROCEDURE — 71271 CT THORAX LUNG CANCER SCR C-: CPT

## 2022-11-08 PROCEDURE — 71271 CT THORAX LUNG CANCER SCR C-: CPT | Mod: 26

## 2022-11-10 ENCOUNTER — APPOINTMENT (OUTPATIENT)
Dept: PULMONOLOGY | Facility: CLINIC | Age: 57
End: 2022-11-10

## 2022-11-10 VITALS
RESPIRATION RATE: 16 BRPM | SYSTOLIC BLOOD PRESSURE: 125 MMHG | OXYGEN SATURATION: 98 % | DIASTOLIC BLOOD PRESSURE: 83 MMHG | HEART RATE: 83 BPM

## 2022-11-10 DIAGNOSIS — Z87.891 PERSONAL HISTORY OF NICOTINE DEPENDENCE: ICD-10-CM

## 2022-11-10 DIAGNOSIS — R05.9 COUGH, UNSPECIFIED: ICD-10-CM

## 2022-11-10 DIAGNOSIS — R91.8 OTHER NONSPECIFIC ABNORMAL FINDING OF LUNG FIELD: ICD-10-CM

## 2022-11-10 PROCEDURE — 99213 OFFICE O/P EST LOW 20 MIN: CPT

## 2022-11-10 RX ORDER — COVID-19 ANTIGEN TEST
KIT MISCELLANEOUS
Qty: 8 | Refills: 0 | Status: COMPLETED | COMMUNITY
Start: 2022-10-20

## 2022-11-10 NOTE — PROCEDURE
[FreeTextEntry1] : \par   CT Chest Lung Cancer Screening             Final\par \par No Documents Attached\par \par \par \par \par   EXAM: [id number] - CT LDCT LUNG CA SCREENING  - ORDERED BY: NGHIA ROBLES\par \par \par PROCEDURE DATE:  11/08/2022\par \par \par \par INTERPRETATION:  HISTORY: Ordering Dxs: Z87.891 Personal history of nicotine dependence /// Admitting Dxs: Z87.891 PERSONAL HISTORY OF NICOTINE DEPENDENCE\par \par EXAMINATION: Low dose CT CHEST was performed without intravenous contrast.\par \par COMPARISON: 6/18/2018.\par \par FINDINGS:\par \par LUNG NODULES: Several pulmonary micronodules unchanged; reference right upper lobe 0.3 cm nodule (image 34, series 2).\par \par NARRATIVE FINDINGS:\par \par Clear central airways. No consolidation. No pleural effusion.\par \par Normal heart size. Coronary atherosclerosis. No pericardial effusion. Thoracic aorta normal caliber.  No large mediastinal lymph nodes.\par \par Imaged abdomen, soft tissues, and osseous structures unremarkable.\par \par IMPRESSION:.\par \par Unchanged pulmonary micronodules.\par \par ACR Lung-RADS Category: 2, benign appearance or behavior; continue screening with low dose CT chest in 12 months.\par \par Coronary atherosclerosis.\par \par --- End of Report ---\par \par \par \par \par \par \par THOMAS BOWERS MD; Attending Radiologist\par This document has been electronically signed. Nov 10 2022 10:42AM\par \par  \par \par  Ordered by: NGHIA ROBLES       Collected/Examined: 08Nov2022 01:52PM       \par Verification Required       Stage: Final       \par  Performed at: [facility]       Resulted: 10Nov2022 10:36AM       Last Updated: 10Nov2022 10:46AM       Accession: [id number]

## 2022-11-10 NOTE — REASON FOR VISIT
[Follow-Up] : a follow-up visit [Abnormal CXR/ Chest CT] : an abnormal CXR/ chest CT [Cough] : cough [TextBox_44] : Cough is better

## 2023-03-20 NOTE — ED ADULT TRIAGE NOTE - NS ED NOTE AC HIGH RISK COUNTRIES
Rx Refill Note  Requested Prescriptions     Pending Prescriptions Disp Refills   • gabapentin (NEURONTIN) 400 MG capsule [Pharmacy Med Name: Gabapentin Oral Capsule 400 MG] 270 capsule 0     Sig: TAKE 1 CAPSULE BY MOUTH THREE TIMES A DAY      Last office visit with prescribing clinician: 12/6/2022   Next office visit with prescribing clinician: 4/12/2023    UDS: not of file   CSA: not on file   {    Lucrecia Schneider MA  03/20/23, 09:11 EDT  
No

## 2023-11-01 ENCOUNTER — APPOINTMENT (OUTPATIENT)
Dept: PULMONOLOGY | Facility: CLINIC | Age: 58
End: 2023-11-01

## 2025-03-24 NOTE — CONSULT NOTE ADULT - PROBLEM/RECOMMENDATION-3
[FreeTextEntry1] : 03/24/2025, addendum, SG Called and reviewed labs TFT at goal on MMI 5mg on M/W/F CMP WNL CBC stable -- continue current regimen DISPLAY PLAN FREE TEXT